# Patient Record
Sex: MALE | Race: WHITE | NOT HISPANIC OR LATINO | URBAN - METROPOLITAN AREA
[De-identification: names, ages, dates, MRNs, and addresses within clinical notes are randomized per-mention and may not be internally consistent; named-entity substitution may affect disease eponyms.]

---

## 2018-07-24 ENCOUNTER — THERAPY VISIT (OUTPATIENT)
Dept: PHYSICAL THERAPY | Facility: CLINIC | Age: 38
End: 2018-07-24
Payer: COMMERCIAL

## 2018-07-24 DIAGNOSIS — M54.50 ACUTE BILATERAL LOW BACK PAIN WITHOUT SCIATICA: ICD-10-CM

## 2018-07-24 PROCEDURE — 97161 PT EVAL LOW COMPLEX 20 MIN: CPT | Mod: GP | Performed by: PHYSICAL THERAPIST

## 2018-07-24 PROCEDURE — 97110 THERAPEUTIC EXERCISES: CPT | Mod: GP | Performed by: PHYSICAL THERAPIST

## 2018-07-24 PROCEDURE — 97530 THERAPEUTIC ACTIVITIES: CPT | Mod: GP | Performed by: PHYSICAL THERAPIST

## 2018-07-24 NOTE — PROGRESS NOTES
[facility] Initial Evaluation -- Lumbar    Date: July 24, 2018  Samuel Angeles is a 38 year old male with a lumbar spine condition.   Referral: GP  Work mechanical stresses:   - Computer, prolonged sitting  Employment status:  Working normal hours  Leisure mechanical stresses: No formal exercise.  Functional disability score (ANDRES/STarT Back):  See ANDRES/STarT Back flow sheet  VAS score (0-10): 1/10  Patient goals/expectations:  Reduce pain completely in low back as it has been improving since initial injury    HISTORY:    Present symptoms: (B) low back  Pain quality (sharp/shooting/stabbing/aching/burning/cramping):  Achy   Paresthesia (yes/no):  No    Present since (onset date): June 2018.     Symptoms (improving/unchanging/worsening):  Improving.     Symptoms commenced as a result of: Going down water slides at [employer]   Condition occurred in the following environment:   Community     Symptoms at onset (back/thigh/leg): Lt low back  Constant symptoms (back/thigh/leg): None  Intermittent symptoms (back/thigh/leg): As above    Symptoms are made worse with the following: Always Bending, Always Sitting, Time of day - No effect and Always When still   Symptoms are made better with the following: Sometimes When still, laying on back    Disturbed sleep (yes/no):  No Sleeping postures (prone/sup/side R/L): Stomach or sides    Previous episodes (0/1-5/6-10/11+): 1 Year of first episode: 18 yrs ago    Previous history: Yes, 1 epsisode  Previous treatments: PT with past episode.  Resolved up until recent episode.      Specific Questions:  Cough/Sneeze/Strain (pos/neg): Pos initially but not now  Bowel/Bladder (normal/abnormal): Normal  Gait (normal/abnormal): Normal  Medications (nil/NSAIDS/analg/steroids/anticoag/other):  None  Medical allergies:  None  General health (excellent/good/fair/poor):  Good  Pertinent medical history:  Depression and Overweight  Imaging (None/Xray/MRI/Other):   None  Recent or major surgery (yes/no):  No  Night pain (yes/no): No  Accidents (yes/no): No  Unexplained weight loss (yes/no): No  Barriers at home: None  Other red flags: None    EXAMINATION    Posture:   Sitting (good/fair/poor): Fair  Standing (good/fair/poor):Fair  Lordosis (red/acc/normal): Reduced  Correction of posture (better/worse/no effect): Better    Lateral Shift (right/left/nil): Nil  Relevant (yes/no):  NA  Other Observations: NA    Neurological:    Motor deficit:  NA  Reflexes:  NA  Sensory deficit:  NA  Dural signs:  Pos slump (B)    Movement Loss:   Maykel Mod Min Nil Pain   Flexion   X  ERP, fingers to mid shin   Extension   X     Side Gliding R    X ERP   Side Gliding L    X      Test Movements:   During: produces, abolishes, increases, decreases, no effect, centralizing, peripheralizing   After: better, worse, no better, no worse, no effect, centralized, peripheralized    Pretest symptoms standing: (B) Low back   Symptoms During Symptoms After ROM increased ROM decreased No Effect   FIS        Rep FIS        EIS        Rep EIS        Pretest symptoms lyin/10 (B) low back    Symptoms During Symptoms After ROM increased ROM decreased No Effect   APURVA        Rep APURVA        EIL No Effect No Effect         Rep EIL No Effect    Better    X     If required, pretest symptoms:    Symptoms During Symptoms After ROM increased ROM decreased No Effect   SGIS - R        Rep SGIS - R        SGIS - L        Rep SGIS - L          Static Tests:  Sitting slouched:    Sitting erect:    Standing slouched   Standing erect:    Lying prone in extension:   Long sitting:      Other Tests: NT    Provisional Classification:  Derangement - Bilateral, symmetrical, symptoms above knee    Principle of Management:  Education:  Posture, specificity of exercise  Equipment provided:  None.  Use of rolled towel for posture correction  Mechanical therapy (Y/N):  Y   Extension principle:  EIL x 10 reps every 2 hrs, EIS as  alternative  Lateral Principle:    Flexion principle:    Other:      ASSESSMENT/PLAN:    Patient is a 38 year old male with lumbar complaints.    Patient has the following significant findings with corresponding treatment plan.                Diagnosis 1:  LBP    Pain -  self management, education, directional preference exercise and home program  Decreased ROM/flexibility - manual therapy, therapeutic exercise and home program  Decreased joint mobility - manual therapy, therapeutic exercise and home program  Impaired muscle performance - neuro re-education and home program  Decreased function - therapeutic activities and home program  Impaired posture - neuro re-education and home program    Therapy Evaluation Codes:   1) History comprised of:   Personal factors that impact the plan of care:      None.    Comorbidity factors that impact the plan of care are:      Depression and Overweight.     Medications impacting care: None.  2) Examination of Body Systems comprised of:   Body structures and functions that impact the plan of care:      Lumbar spine.   Activity limitations that impact the plan of care are:      Bending and Sitting.  3) Clinical presentation characteristics are:   Evolving/Changing.  4) Decision-Making    Low complexity using standardized patient assessment instrument and/or measureable assessment of functional outcome.  Cumulative Therapy Evaluation is: Low complexity.    Previous and current functional limitations:  (See Goal Flow Sheet for this information)    Short term and Long term goals: (See Goal Flow Sheet for this information)     Communication ability:  Patient appears to be able to clearly communicate and understand verbal and written communication and follow directions correctly.  Treatment Explanation - The following has been discussed with the patient:   RX ordered/plan of care  Anticipated outcomes  Possible risks and side effects  This patient would benefit from PT intervention to  resume normal activities.   Rehab potential is good.    Frequency:  2 X month, once daily  Duration:  for 1 month  Discharge Plan:  Achieve all LTG.  Independent in home treatment program.  Reach maximal therapeutic benefit.    Please refer to the daily flowsheet for treatment today, total treatment time and time spent performing 1:1 timed codes.

## 2018-07-24 NOTE — LETTER
[facility]THLETIC [facility] PHYSICAL THERAPy  [address]  [facility] [address]  [phone]    2018    Re: Samuel Briceño   :   1980  MRN:  [id number]   REFERRING PHYSICIAN:   Scotty Cummings    [facility]THLET[facility] PHYSICAL THERAPY    Date of Initial Evaluation:  2018  Visits:    1  Reason for Referral:  Acute bilateral low back pain without sciatica    [facility] Initial Evaluation -- Lumbar  Date: 2018  Samuel Angeles is a 38 year old male with a lumbar spine condition.   Referral: GP  Work mechanical stresses:   - Computer, prolonged sitting  Employment status:  Working normal hours  Leisure mechanical stresses: No formal exercise.  Functional disability score (ANDRES/STarT Back):  See ANDRES/STarT Back flow sheet  VAS score (0-10): 1/10  Patient goals/expectations:  Reduce pain completely in low back as it has been improving since initial injury    HISTORY:  Present symptoms: (B) low back  Pain quality (sharp/shooting/stabbing/aching/burning/cramping):  Achy   Paresthesia (yes/no):  No  Present since (onset date): 2018.     Symptoms (improving/unchanging/worsening):  Improving.   Symptoms commenced as a result of: Going down water slides at [place]   Condition occurred in the following environment:   Community   Symptoms at onset (back/thigh/leg): Lt low back  Constant symptoms (back/thigh/leg): None  Intermittent symptoms (back/thigh/leg): As above  Symptoms are made worse with the following: Always Bending, Always Sitting, Time of day - No effect and Always When still   Symptoms are made better with the following: Sometimes When still, laying on back  Disturbed sleep (yes/no):  No   Sleeping postures (prone/sup/side R/L): Stomach or sides  Previous episodes (0/1-5/6-10/11+): 1   Year of first episode: 18 yrs ago  Previous history: Yes, 1 epsisode  Previous treatments: PT with past  episode.  Resolved up until recent episode.          Re: Samuel Romannerafael   :   1980    Specific Questions:  Cough/Sneeze/Strain (pos/neg): Pos initially but not now  Bowel/Bladder (normal/abnormal): Normal  Gait (normal/abnormal): Normal  Medications (nil/NSAIDS/analg/steroids/anticoag/other):  None  Medical allergies:  None  General health (excellent/good/fair/poor):  Good  Pertinent medical history:  Depression and Overweight  Imaging (None/Xray/MRI/Other):  None  Recent or major surgery (yes/no):  No  Night pain (yes/no): No  Accidents (yes/no): No  Unexplained weight loss (yes/no): No  Barriers at home: None  Other red flags: None    EXAMINATION    Posture:   Sitting (good/fair/poor): Fair  Standing (good/fair/poor):Fair  Lordosis (red/acc/normal): Reduced  Correction of posture (better/worse/no effect): Better  Lateral Shift (right/left/nil): Nil  Relevant (yes/no):  NA  Other Observations: NA    Neurological:  Motor deficit:  NA  Reflexes:  NA  Sensory deficit:  NA  Dural signs:  Pos slump (B)  Movement Loss:   Maykel Mod Min Nil Pain   Flexion   X  ERP, fingers to mid shin   Extension   X     Side Gliding R    X ERP   Side Gliding L    X      Test Movements:   During: produces, abolishes, increases, decreases, no effect, centralizing, peripheralizing   After: better, worse, no better, no worse, no effect, centralized, peripheralized    Pretest symptoms standing: (B) Low back   Symptoms During Symptoms After ROM increased ROM decreased No Effect   FIS        Rep FIS        EIS        Rep EIS          Re: Samuel LY Navarro   :   1980    Pretest symptoms lyin/10 (B) low back    Symptoms During Symptoms After ROM increased ROM decreased No Effect   APURVA        Rep APURVA        EIL No Effect No Effect         Rep EIL No Effect    Better    X     If required, pretest symptoms:    Symptoms During Symptoms After ROM increased ROM decreased No Effect   SGIS - R        Rep SGIS - R        SGIS - L         Rep SGIS - L          Static Tests:  Sitting slouched:    Sitting erect:    Standing slouched   Standing erect:    Lying prone in extension:   Long sitting:    Other Tests: NT  Provisional Classification:  Derangement - Bilateral, symmetrical, symptoms above knee  Principle of Management:  Education:  Posture, specificity of exercise  Equipment provided:  None.  Use of rolled towel for posture correction  Mechanical therapy (Y/N):  Y   Extension principle:  EIL x 10 reps every 2 hrs, EIS as alternative   Lateral Principle:    Flexion principle:      Other:      ASSESSMENT/PLAN:  Patient is a 38 year old male with lumbar complaints.    Patient has the following significant findings with corresponding treatment plan.                Diagnosis 1:  LBP    Pain -  self management, education, directional preference exercise and home program  Decreased ROM/flexibility - manual therapy, therapeutic exercise and home program  Decreased joint mobility - manual therapy, therapeutic exercise and home program  Impaired muscle performance - neuro re-education and home program  Decreased function - therapeutic activities and home program  Impaired posture - neuro re-education and home program                Re: Samuel Briceño   :   1980    Therapy Evaluation Codes:   1) History comprised of:   Personal factors that impact the plan of care:      None.    Comorbidity factors that impact the plan of care are:      Depression and Overweight.     Medications impacting care: None.  2) Examination of Body Systems comprised of:   Body structures and functions that impact the plan of care:      Lumbar spine.   Activity limitations that impact the plan of care are:      Bending and Sitting.  3) Clinical presentation characteristics are:   Evolving/Changing.  4) Decision-Making    Low complexity using standardized patient assessment instrument and/or   measureable assessment of functional outcome.  Cumulative Therapy Evaluation  is: Low complexity.    Previous and current functional limitations:  (See Goal Flow Sheet for this information)    Short term and Long term goals: (See Goal Flow Sheet for this information)   Communication ability:  Patient appears to be able to clearly communicate and understand verbal and written communication and follow directions correctly.  Treatment Explanation - The following has been discussed with the patient:   RX ordered/plan of care, Anticipated outcomes, Possible risks and side effects    This patient would benefit from PT intervention to resume normal activities.   Rehab potential is good.  Frequency:  2 X month, once daily  Duration:  for 1 month  Discharge Plan:  Achieve all LTG.  Independent in home treatment program.  Reach maximal therapeutic benefit.    Thank you for your referral.    INQUIRIES        Therapist:   priyanka George MDT, DPT  [facility] PHYSICAL THERAPY  [address] [facility] [address]  [facility] [address]  Phone: [phone]  Fax: [phone]

## 2018-09-24 NOTE — PROGRESS NOTES
SUBJECTIVE:   Samuel Briceño is a 38 year old male who presents to clinic today for the following health issues:       Anxiety  Anxiety attack     Get acquainted visit with a new patient to the Internal Medicine department. This is a very precise oriented  with [employer]. He's brought a typed page of notes from his spouse who details his mental health history. Patient had a nice relationship prior with a Dr. Rodrigues but that  Isn't available in the same way now. Patient seeking new primary care physician     Management of depression and anxiety was ok with prior MD at [facility], offices in [facility]. That  Went to [facility] and patient saw him there, but patient felt the referral network wasn't good and some other levels of frustrations.    Basically this is a physically healthy 38 young man  With roughly a 5-7 year history of slowly churning anxiety which is mainly a low level of constant perfectionist anxiety and working a very demanding occupation as a supervisor ,  with 2 young children. He is tethered to work by [employer] and has not had enough time in my opinion for de-stressing. He's using alcohol at bedtime 1-3 drinks, isn't following any formal structured exercise program and works late most evenings. He has a very limited social life due to major demands with his time. He confesses to roughly between 40-55 hours per week but I am not so sure he's accurately counting the number of hours he works at home. He has a passion for fishing but it's not entirely clear to me if he's getting much time for this.    His anxiety is punctuated by a few major anxiety attacks. These aren't the classic panic attack variety. He doesn't have the strong physical components of hyperadrenergic response pattern . Rather he gets overwhelmed with a feelings of inability to cope and starts down a road of simply becoming emotionally overwhelmed to the point that he's  been at an emergency room at the very least 2 times. We obtained many notes via care everywhere and typed notes from spouse are quite helpful.    Patient has been tried on Ativan (Lorazepam) and clonazePAM (KLONOPIN) which he has used quite sparingly and also been on citalopram [ Celexa ] which worked well and he stopped due to doing well. Later tried escitalopram (LEXAPRO) which did not work as well. He met once with a counseling psychologist but is not seeing anybody else right now      Problem list and histories reviewed & adjusted, as indicated.  Additional history: as documented    Patient Active Problem List   Diagnosis     Acute bilateral low back pain without sciatica     Anxiety attack     Anxiety     History reviewed. No pertinent surgical history.    Social History   Substance Use Topics     Smoking status: Never Smoker     Smokeless tobacco: Current User     Types: Chew     Alcohol use Not on file      Comment: 1 beer most days     Family History   Problem Relation Age of Onset     Asthma Father      Colon Cancer Maternal Grandfather      Hypertension Paternal Grandmother      Colon Cancer Paternal Grandmother          Current Outpatient Prescriptions   Medication Sig Dispense Refill     citalopram (CELEXA) 20 MG tablet Take 1 tablet (20 mg) by mouth daily 90 tablet 1     LORazepam (ATIVAN) 0.5 MG tablet Take 1 tablet (0.5 mg) by mouth every 8 hours as needed for anxiety 28 days or more between refills for controlled medications 10 tablet 1     Not on File  No lab results found.   BP Readings from Last 3 Encounters:   09/27/18 111/80    Wt Readings from Last 3 Encounters:   09/27/18 187 lb 12.8 oz (85.2 kg)                  Labs reviewed in EPIC    Reviewed and updated as needed this visit by clinical staff       Reviewed and updated as needed this visit by Provider         ROS:  Constitutional, HEENT, cardiovascular, pulmonary, gi and gu systems are negative, except as otherwise noted.    OBJECTIVE:      "                                               /80  Pulse 78  Temp 97.3  F (36.3  C) (Oral)  Resp 16  Ht 5' 10.08\" (1.78 m)  Wt 187 lb 12.8 oz (85.2 kg)  SpO2 95%  BMI 26.89 kg/m2  Body mass index is 26.89 kg/(m^2).  GENERAL APPEARANCE: healthy, alert and no distress, comes across as an articulate and intelligent and interesting individual. Normal grooming and affect.   EYES: Eyes grossly normal to inspection, PERRL and conjunctivae and sclerae normal  NEURO: Normal strength and tone, mentation intact and speech normal, appropriate fund of knowledge , no psychomotor agitation or retardation    PSYCH: mentation appears normal and pensive    Diagnostic test results:  Diagnostic Test Results:  none      ASSESSMENT/PLAN:                                                    1. Anxiety  we had a long discussion. From the sound of things we can help this patient by insisting he probably cut back just a little bit with work. A formal structured exercise program would add something. Cut back to nothing more then 1 drink in the evening. Probably having breakfast would help. We agreed to restart citalopram [ Celexa ] which worked best and emergency Ativan (Lorazepam), further follow up depending on how things go. Consider psychiatrist  Or counseling psychologist  . Need to touch base within 2-3 months   - citalopram (CELEXA) 20 MG tablet; Take 1 tablet (20 mg) by mouth daily  Dispense: 90 tablet; Refill: 1    2. Anxiety attack  As above   - LORazepam (ATIVAN) 0.5 MG tablet; Take 1 tablet (0.5 mg) by mouth every 8 hours as needed for anxiety 28 days or more between refills for controlled medications  Dispense: 10 tablet; Refill: 1      Follow up with Provider - as detailed above      Suhail Funk MD  [facility]    "

## 2018-09-27 ENCOUNTER — OFFICE VISIT (OUTPATIENT)
Dept: FAMILY MEDICINE | Facility: CLINIC | Age: 38
End: 2018-09-27
Payer: COMMERCIAL

## 2018-09-27 VITALS
BODY MASS INDEX: 26.88 KG/M2 | OXYGEN SATURATION: 95 % | DIASTOLIC BLOOD PRESSURE: 80 MMHG | HEIGHT: 70 IN | WEIGHT: 187.8 LBS | RESPIRATION RATE: 16 BRPM | SYSTOLIC BLOOD PRESSURE: 111 MMHG | HEART RATE: 78 BPM | TEMPERATURE: 97.3 F

## 2018-09-27 DIAGNOSIS — F41.0 ANXIETY ATTACK: ICD-10-CM

## 2018-09-27 DIAGNOSIS — F41.9 ANXIETY: Primary | ICD-10-CM

## 2018-09-27 PROCEDURE — 99214 OFFICE O/P EST MOD 30 MIN: CPT | Performed by: INTERNAL MEDICINE

## 2018-09-27 RX ORDER — CITALOPRAM HYDROBROMIDE 20 MG/1
20 TABLET ORAL DAILY
Qty: 90 TABLET | Refills: 1 | Status: SHIPPED | OUTPATIENT
Start: 2018-09-27

## 2018-09-27 RX ORDER — LORAZEPAM 0.5 MG/1
0.5 TABLET ORAL EVERY 8 HOURS PRN
Qty: 10 TABLET | Refills: 1 | Status: SHIPPED | OUTPATIENT
Start: 2018-09-27

## 2018-09-27 ASSESSMENT — PAIN SCALES - GENERAL: PAINLEVEL: NO PAIN (0)

## 2018-09-27 NOTE — MR AVS SNAPSHOT
After Visit Summary   9/27/2018    Samuel Briceño    MRN: [id number]           Patient Information     Date Of Birth          1980        Visit Information        Provider Department      9/27/2018 5:50 PM Suhail Funk MD [facility]        Today's Diagnoses     Anxiety    -  1    Anxiety attack          Care Instructions    [facility]    If you have any questions regarding to your visit please contact your care team:     Team Pink:   Clinic Hours Telephone Number   Internal Medicine:  Dr. Heydi Galarza NP 7am-7pm  Monday - Thursday   7am-5pm  Fridays  [phone]  (Appointment scheduling available 24/7)   Urgent Care - [place] - 11am-9pm Monday-Friday Saturday-Sunday- 9am-5pm   [place] - 5pm-9pm Monday-Friday Saturday-Sunday- 9am-5pm  [phone] - [place] [facility]  [phone] - [place]       What options do I have for a visit other than an office visit? We offer electronic visits (e-visits) and telephone visits, when medically appropriate.  Please check with your medical insurance to see if these types of visits are covered, as you will be responsible for any charges that are not paid by your insurance.      You can use [employer] (secure electronic communication) to access to your chart, send your primary care provider a message, or make an appointment. Ask a team member how to get started.     For a price quote for your services, please call our Consumer Price Line at [phone] or our Imaging Cost estimation line at [phone] (for imaging tests).            Follow-ups after your visit        Who to contact     If you have questions or need follow up information about today's clinic visit or your schedule please contact [facility] directly at [phone].  Normal or non-critical lab and imaging results will be communicated to you by [employer], letter or phone within 4 business  "days after the clinic has received the results. If you do not hear from us within 7 days, please contact the clinic through [employer] or phone. If you have a critical or abnormal lab result, we will notify you by phone as soon as possible.  Submit refill requests through [employer] or call your pharmacy and they will forward the refill request to us. Please allow 3 business days for your refill to be completed.          Additional Information About Your Visit        Care EveryWhere ID     This is your Care EveryWhere ID. This could be used by other organizations to access your [facility] medical records  [id number]        Your Vitals Were     Pulse Temperature Respirations Height Pulse Oximetry BMI (Body Mass Index)    78 97.3  F (36.3  C) (Oral) 16 5' 10.08\" (1.78 m) 95% 26.89 kg/m2       Blood Pressure from Last 3 Encounters:   09/27/18 111/80    Weight from Last 3 Encounters:   09/27/18 187 lb 12.8 oz (85.2 kg)              Today, you had the following     No orders found for display         Today's Medication Changes          These changes are accurate as of 9/27/18  6:32 PM.  If you have any questions, ask your nurse or doctor.               Start taking these medicines.        Dose/Directions    citalopram 20 MG tablet   Commonly known as:  celeXA   Used for:  Anxiety   Started by:  Suhail Funk MD        Dose:  20 mg   Take 1 tablet (20 mg) by mouth daily   Quantity:  90 tablet   Refills:  1       LORazepam 0.5 MG tablet   Commonly known as:  ATIVAN   Used for:  Anxiety attack   Started by:  Suhail Funk MD        Dose:  0.5 mg   Take 1 tablet (0.5 mg) by mouth every 8 hours as needed for anxiety 28 days or more between refills for controlled medications   Quantity:  10 tablet   Refills:  1            Where to get your medicines      These medications were sent to [facility] [address] [place] [address] [address] [place] [address]  [address] [place] [id number]     Phone:  [phone]     citalopram 20 " MG tablet         Some of these will need a paper prescription and others can be bought over the counter.  Ask your nurse if you have questions.     Bring a paper prescription for each of these medications     LORazepam 0.5 MG tablet                Primary Care Provider Office Phone # Fax #    Gold Rodrigues [phone]       [facility] [address] [facility] [address] PATRICIA PRECIADO [address] [place] [id number]        Equal Access to Services     [facility] : Hadii aad ku hadasho Soomaali, waaxda luqadaha, qaybta kaalmada adeegyada, waxay idiin hayaan adeeg khenriquesh la'rosalian . So [facility] [phone].    ATENCIÓN: Si habla espmarylin, tiene a luu disposición servicios gratuitos de asistencia lingüística. [facility] [phone].    We comply with applicable federal civil rights laws and [place] laws. We do not discriminate on the basis of race, color, national origin, age, disability, sex, sexual orientation, or gender identity.            Thank you!     Thank you for choosing [facility]  for your care. Our goal is always to provide you with excellent care. Hearing back from our patients is one way we can continue to improve our services. Please take a few minutes to complete the written survey that you may receive in the mail after your visit with us. Thank you!             Your Updated Medication List - Protect others around you: Learn how to safely use, store and throw away your medicines at www.disposemymeds.org.          This list is accurate as of 9/27/18  6:32 PM.  Always use your most recent med list.                   Brand Name Dispense Instructions for use Diagnosis    citalopram 20 MG tablet    celeXA    90 tablet    Take 1 tablet (20 mg) by mouth daily    Anxiety       LORazepam 0.5 MG tablet    ATIVAN    10 tablet    Take 1 tablet (0.5 mg) by mouth every 8 hours as needed for anxiety 28 days or more between refills for controlled medications    Anxiety attack

## 2018-09-27 NOTE — PATIENT INSTRUCTIONS
[facility]    If you have any questions regarding to your visit please contact your care team:     Team Pink:   Clinic Hours Telephone Number   Internal Medicine:  Dr. Heydi Galarza NP 7am-7pm  Monday - Thursday   7am-5pm  Fridays  [phone]  (Appointment scheduling available 24/7)   Urgent Care - [place] and [facility] - 11am-9pm Monday-Friday Saturday-Sunday- 9am-5pm   [place] - 5pm-9pm Monday-Friday Saturday-Sunday- 9am-5pm  [phone] - [place]  [phone] - [place]       What options do I have for a visit other than an office visit? We offer electronic visits (e-visits) and telephone visits, when medically appropriate.  Please check with your medical insurance to see if these types of visits are covered, as you will be responsible for any charges that are not paid by your insurance.      You can use [employer] (secure electronic communication) to access to your chart, send your primary care provider a message, or make an appointment. Ask a team member how to get started.     For a price quote for your services, please call our Consumer Price Line at [phone] or our Imaging Cost estimation line at [phone] (for imaging tests).

## 2020-01-12 DIAGNOSIS — F41.9 ANXIETY: ICD-10-CM

## 2020-01-14 RX ORDER — CITALOPRAM HYDROBROMIDE 20 MG/1
20 TABLET ORAL DAILY
Qty: 90 TABLET | Refills: 0 | OUTPATIENT
Start: 2020-01-14

## 2020-01-14 NOTE — TELEPHONE ENCOUNTER
Last OV 9/27/2018 and Rx was only written for a 6 month supply.   Patient due for appointment.     Rica Frye RN

## 2020-02-23 ENCOUNTER — HEALTH MAINTENANCE LETTER (OUTPATIENT)
Age: 40
End: 2020-02-23

## 2024-09-18 ENCOUNTER — VIRTUAL VISIT (OUTPATIENT)
Dept: FAMILY MEDICINE | Facility: CLINIC | Age: 44
End: 2024-09-18
Payer: COMMERCIAL

## 2024-09-18 DIAGNOSIS — U07.1 INFECTION DUE TO 2019 NOVEL CORONAVIRUS: Primary | ICD-10-CM

## 2024-09-18 PROCEDURE — 99203 OFFICE O/P NEW LOW 30 MIN: CPT | Mod: 95 | Performed by: FAMILY MEDICINE

## 2024-09-18 NOTE — PROGRESS NOTES
Samuel is a 44 year old who is being evaluated via a billable video visit.    How would you like to obtain your AVS? MyChart  If the video visit is dropped, the invitation should be resent by: Text to cell phone: [phone]  Will anyone else be joining your video visit? No      Assessment & Plan     Infection due to 2019 novel coronavirus  COVID-19 positive patient.  Encounter for consideration of medication intervention. Patient does qualify for a prescription. Full discussion with patient including medication options, risks and benefits. Potential drug interactions reviewed with patient.  We mutually agreed to proceed with Plaxlovid.  No medication interactions identified.  Patient's renal function is normal.  Plan:  - nirmatrelvir and ritonavir (PAXLOVID) 300 mg/100 mg therapy pack; Take 3 tablets by mouth 2 times daily for 5 days. (Take 2 Nirmatrelvir tablets and 1 Ritonavir tablet twice daily for 5 days)    Nicotine/Tobacco Cessation  He reports that he has never smoked. His smokeless tobacco use includes chew.    Subjective   Samuel is a 44 year old, presenting for the following health issues:  Covid Concern      HPI   44-year-old with past medical history of anxiety who scheduled a virtual visit to discuss medications for COVID-19 infection.  Symptoms started on September 16, 2024.  Initial symptoms included body aches, fatigue, cough and extreme lethargy.  Slept most of the day on Monday.  Over the following 48 hours patient developed a dry cough and nasal congestion.  Denies any chest pain or difficulty breathing.  Denies any fevers or chills.  Patient is fully vaccinated.  He received 2 doses of his primary series and 1 dose of the booster vaccine.         Review of Systems  Constitutional, neuro, ENT, endocrine, pulmonary, cardiac, gastrointestinal, genitourinary, musculoskeletal, integument and psychiatric systems are negative, except as otherwise noted.      Objective           Vitals:  No vitals were  obtained today due to virtual visit.    Physical Exam   GENERAL: alert and no distress  EYES: Eyes grossly normal to inspection.  No discharge or erythema, or obvious scleral/conjunctival abnormalities.  RESP: No audible wheeze, cough, or visible cyanosis.    SKIN: Visible skin clear. No significant rash, abnormal pigmentation or lesions.  NEURO: Cranial nerves grossly intact.  Mentation and speech appropriate for age.  PSYCH: Appropriate affect, tone, and pace of words    No results found for any visits on 09/18/24.      Video-Visit Details    Type of service:  Video Visit   Originating Location (pt. Location): Home  Distant Location (provider location):  On-site  Platform used for Video Visit: Marj  Signed Electronically by: Jorge Gallegos MD